# Patient Record
Sex: FEMALE | Race: WHITE | URBAN - METROPOLITAN AREA
[De-identification: names, ages, dates, MRNs, and addresses within clinical notes are randomized per-mention and may not be internally consistent; named-entity substitution may affect disease eponyms.]

---

## 2022-08-01 ENCOUNTER — OFFICE VISIT (OUTPATIENT)
Dept: URBAN - METROPOLITAN AREA TELEHEALTH 2 | Facility: TELEHEALTH | Age: 32
End: 2022-08-01

## 2022-08-01 NOTE — HPI-TODAY'S VISIT:
Referred Dr. Keshia Putnam for evalaution of abdominal pain.  A copy of this note will be sent to the referring provider.    CT A/P w/o contrast on 07/13/2022:  Normal.  RUQ US 07/11/2022:  Normal.

## 2022-10-06 ENCOUNTER — OFFICE VISIT (OUTPATIENT)
Dept: URBAN - METROPOLITAN AREA CLINIC 70 | Facility: CLINIC | Age: 32
End: 2022-10-06

## 2022-10-06 RX ORDER — SERTRALINE 50 MG/1
1 TABLET TABLET, FILM COATED ORAL ONCE A DAY
Status: ACTIVE | COMMUNITY

## 2022-10-06 RX ORDER — BUPROPION HYDROCHLORIDE 100 MG/1
1 TABLET TABLET, FILM COATED ORAL TWICE A DAY
Status: ACTIVE | COMMUNITY

## 2022-10-06 RX ORDER — CLONAZEPAM 0.25 MG/1
1 TABLET ON THE TONGUE AND ALLOW TO DISSOLVE 30 MINUTES BEFORE BEDTIME TABLET, ORALLY DISINTEGRATING ORAL ONCE A DAY
Status: ACTIVE | COMMUNITY

## 2022-10-06 RX ORDER — DICLOFENAC SODIUM 75 MG/1
1 TABLET AS NEEDED TABLET, DELAYED RELEASE ORAL TWICE A DAY
Status: ACTIVE | COMMUNITY

## 2023-03-02 ENCOUNTER — WEB ENCOUNTER (OUTPATIENT)
Dept: URBAN - METROPOLITAN AREA CLINIC 70 | Facility: CLINIC | Age: 33
End: 2023-03-02

## 2023-03-02 ENCOUNTER — LAB OUTSIDE AN ENCOUNTER (OUTPATIENT)
Dept: URBAN - METROPOLITAN AREA CLINIC 70 | Facility: CLINIC | Age: 33
End: 2023-03-02

## 2023-03-02 ENCOUNTER — OFFICE VISIT (OUTPATIENT)
Dept: URBAN - METROPOLITAN AREA CLINIC 70 | Facility: CLINIC | Age: 33
End: 2023-03-02
Payer: COMMERCIAL

## 2023-03-02 VITALS
WEIGHT: 106.2 LBS | TEMPERATURE: 98.5 F | BODY MASS INDEX: 17.07 KG/M2 | SYSTOLIC BLOOD PRESSURE: 90 MMHG | DIASTOLIC BLOOD PRESSURE: 55 MMHG | HEIGHT: 66 IN | HEART RATE: 77 BPM

## 2023-03-02 DIAGNOSIS — R10.30 LOWER ABDOMINAL PAIN: ICD-10-CM

## 2023-03-02 DIAGNOSIS — R10.13 EPIGASTRIC ABDOMINAL PAIN: ICD-10-CM

## 2023-03-02 DIAGNOSIS — N80.9 ENDOMETRIOSIS: ICD-10-CM

## 2023-03-02 DIAGNOSIS — K62.5 RECTAL BLEEDING: ICD-10-CM

## 2023-03-02 DIAGNOSIS — R93.89 ABNORMAL FINDING ON IMAGING: ICD-10-CM

## 2023-03-02 DIAGNOSIS — R19.7 DIARRHEA, UNSPECIFIED TYPE: ICD-10-CM

## 2023-03-02 PROCEDURE — 99204 OFFICE O/P NEW MOD 45 MIN: CPT

## 2023-03-02 RX ORDER — TRAMADOL HYDROCHLORIDE 50 MG/1
1 TABLET AS NEEDED TABLET, FILM COATED ORAL ONCE A DAY
Status: ACTIVE | COMMUNITY

## 2023-03-02 RX ORDER — ALBUTEROL SULFATE 90 UG/1
1 PUFF AS NEEDED AEROSOL, METERED RESPIRATORY (INHALATION)
Status: ACTIVE | COMMUNITY

## 2023-03-02 RX ORDER — ONDANSETRON 4 MG/1
1 TABLET ON THE TONGUE AND ALLOW TO DISSOLVE TABLET, ORALLY DISINTEGRATING ORAL ONCE A DAY
Status: ACTIVE | COMMUNITY

## 2023-03-02 RX ORDER — CLONAZEPAM 0.25 MG/1
1 TABLET ON THE TONGUE AND ALLOW TO DISSOLVE 30 MINUTES BEFORE BEDTIME TABLET, ORALLY DISINTEGRATING ORAL ONCE A DAY
Status: ACTIVE | COMMUNITY

## 2023-03-02 RX ORDER — BUPROPION HYDROCHLORIDE 100 MG/1
1 TABLET TABLET, FILM COATED ORAL TWICE A DAY
Status: ACTIVE | COMMUNITY

## 2023-03-02 NOTE — HPI-TODAY'S VISIT:
The pt presents for diarrhea, lower abdominal pain, abnormal imaging, weight loss, and epigastric abdominal pain.  She states symptoms have been present over the last year.  She states since the onset of symptoms, she has experienced a 10-15lb weight loss. She has a hx of stage 4 endometriosis and underwent a hysterectomy in 2/2022.  Underwent lysis of adhesions in 11/2021 due to adhesions to the cecum and anterior abdominal.  CT 7/13/2022 was normal.  CT 2/6/2023 showed ascending colitis.  Labs 2/12/2023 showed a normal CBC and CMP.  She admits to 2-3 episodes of loose stools a day.  She notes occasional BRB with bowel movements.  She has never had a colonoscopy.  She denies a fhx of colon cancer or IBD.   She admits to epigastric abdominal pain, nausea, and vomiting.  US 7/11/2022 was normal.  She has never had an EGD.  She is not currently taking a PPI.  She denies melena, regurgitation, or dysphagia.

## 2023-03-02 NOTE — PHYSICAL EXAM GASTROINTESTINAL
Abdomen , soft, moderate epigastric TTP, nondistended , no guarding or rigidity , no masses palpable , normal bowel sounds , Liver and Spleen , no hepatosplenomegaly , liver nontender

## 2023-03-06 PROBLEM — 408574004: Status: ACTIVE | Noted: 2023-03-02

## 2023-03-06 PROBLEM — 129103003: Status: ACTIVE | Noted: 2023-03-02

## 2023-03-22 ENCOUNTER — CLAIMS CREATED FROM THE CLAIM WINDOW (OUTPATIENT)
Dept: URBAN - METROPOLITAN AREA SURGERY CENTER 24 | Facility: SURGERY CENTER | Age: 33
End: 2023-03-22
Payer: COMMERCIAL

## 2023-03-22 ENCOUNTER — OFFICE VISIT (OUTPATIENT)
Dept: URBAN - METROPOLITAN AREA SURGERY CENTER 24 | Facility: SURGERY CENTER | Age: 33
End: 2023-03-22

## 2023-03-22 ENCOUNTER — CLAIMS CREATED FROM THE CLAIM WINDOW (OUTPATIENT)
Dept: URBAN - METROPOLITAN AREA CLINIC 4 | Facility: CLINIC | Age: 33
End: 2023-03-22
Payer: COMMERCIAL

## 2023-03-22 DIAGNOSIS — R19.7 ACUTE DIARRHEA: ICD-10-CM

## 2023-03-22 DIAGNOSIS — R93.3 ABN FINDINGS-GI TRACT: ICD-10-CM

## 2023-03-22 DIAGNOSIS — K62.5 ANAL BLEEDING: ICD-10-CM

## 2023-03-22 DIAGNOSIS — K63.89 OTHER SPECIFIED DISEASES OF INTESTINE: ICD-10-CM

## 2023-03-22 PROCEDURE — 88305 TISSUE EXAM BY PATHOLOGIST: CPT | Performed by: PATHOLOGY

## 2023-03-22 PROCEDURE — 45380 COLONOSCOPY AND BIOPSY: CPT | Performed by: INTERNAL MEDICINE

## 2023-03-22 PROCEDURE — G8907 PT DOC NO EVENTS ON DISCHARG: HCPCS | Performed by: INTERNAL MEDICINE

## 2023-03-22 RX ORDER — TRAMADOL HYDROCHLORIDE 50 MG/1
1 TABLET AS NEEDED TABLET, FILM COATED ORAL ONCE A DAY
Status: ACTIVE | COMMUNITY

## 2023-03-22 RX ORDER — CLONAZEPAM 0.25 MG/1
1 TABLET ON THE TONGUE AND ALLOW TO DISSOLVE 30 MINUTES BEFORE BEDTIME TABLET, ORALLY DISINTEGRATING ORAL ONCE A DAY
Status: ACTIVE | COMMUNITY

## 2023-03-22 RX ORDER — ONDANSETRON 4 MG/1
1 TABLET ON THE TONGUE AND ALLOW TO DISSOLVE TABLET, ORALLY DISINTEGRATING ORAL ONCE A DAY
Status: ACTIVE | COMMUNITY

## 2023-03-22 RX ORDER — BUPROPION HYDROCHLORIDE 100 MG/1
1 TABLET TABLET, FILM COATED ORAL TWICE A DAY
Status: ACTIVE | COMMUNITY

## 2023-03-22 RX ORDER — ALBUTEROL SULFATE 90 UG/1
1 PUFF AS NEEDED AEROSOL, METERED RESPIRATORY (INHALATION)
Status: ACTIVE | COMMUNITY

## 2023-04-04 ENCOUNTER — LAB OUTSIDE AN ENCOUNTER (OUTPATIENT)
Dept: URBAN - METROPOLITAN AREA CLINIC 35 | Facility: CLINIC | Age: 33
End: 2023-04-04

## 2023-04-04 ENCOUNTER — CLAIMS CREATED FROM THE CLAIM WINDOW (OUTPATIENT)
Dept: URBAN - METROPOLITAN AREA CLINIC 4 | Facility: CLINIC | Age: 33
End: 2023-04-04
Payer: COMMERCIAL

## 2023-04-04 ENCOUNTER — TELEPHONE ENCOUNTER (OUTPATIENT)
Dept: URBAN - METROPOLITAN AREA CLINIC 35 | Facility: CLINIC | Age: 33
End: 2023-04-04

## 2023-04-04 ENCOUNTER — CLAIMS CREATED FROM THE CLAIM WINDOW (OUTPATIENT)
Dept: URBAN - METROPOLITAN AREA SURGERY CENTER 24 | Facility: SURGERY CENTER | Age: 33
End: 2023-04-04
Payer: COMMERCIAL

## 2023-04-04 ENCOUNTER — OFFICE VISIT (OUTPATIENT)
Dept: URBAN - METROPOLITAN AREA SURGERY CENTER 24 | Facility: SURGERY CENTER | Age: 33
End: 2023-04-04

## 2023-04-04 DIAGNOSIS — R11.2 ACUTE NAUSEA WITH NONBILIOUS VOMITING: ICD-10-CM

## 2023-04-04 DIAGNOSIS — K21.9 ACID REFLUX: ICD-10-CM

## 2023-04-04 DIAGNOSIS — K31.89 ACQUIRED DEFORMITY OF DUODENUM: ICD-10-CM

## 2023-04-04 DIAGNOSIS — K29.70 GASTRITIS, UNSPECIFIED, WITHOUT BLEEDING: ICD-10-CM

## 2023-04-04 LAB
C-REACTIVE PROTEIN, QUANT: <0.2
IMMUNOGLOBULIN A, QN, SERUM: 109
INTERPRETATION: (no result)
SED RATE BY MODIFIED: 2
T-TRANSGLUTAMINASE (TTG) IGA: <1

## 2023-04-04 PROCEDURE — 43239 EGD BIOPSY SINGLE/MULTIPLE: CPT | Performed by: INTERNAL MEDICINE

## 2023-04-04 PROCEDURE — 88312 SPECIAL STAINS GROUP 1: CPT | Performed by: PATHOLOGY

## 2023-04-04 PROCEDURE — G8907 PT DOC NO EVENTS ON DISCHARG: HCPCS | Performed by: INTERNAL MEDICINE

## 2023-04-04 PROCEDURE — 88305 TISSUE EXAM BY PATHOLOGIST: CPT | Performed by: PATHOLOGY

## 2023-04-04 RX ORDER — ONDANSETRON 4 MG/1
1 TABLET ON THE TONGUE AND ALLOW TO DISSOLVE TABLET, ORALLY DISINTEGRATING ORAL ONCE A DAY
Status: ACTIVE | COMMUNITY

## 2023-04-04 RX ORDER — CLONAZEPAM 0.25 MG/1
1 TABLET ON THE TONGUE AND ALLOW TO DISSOLVE 30 MINUTES BEFORE BEDTIME TABLET, ORALLY DISINTEGRATING ORAL ONCE A DAY
Status: ACTIVE | COMMUNITY

## 2023-04-04 RX ORDER — TRAMADOL HYDROCHLORIDE 50 MG/1
1 TABLET AS NEEDED TABLET, FILM COATED ORAL ONCE A DAY
Status: ACTIVE | COMMUNITY

## 2023-04-04 RX ORDER — BUPROPION HYDROCHLORIDE 100 MG/1
1 TABLET TABLET, FILM COATED ORAL TWICE A DAY
Status: ACTIVE | COMMUNITY

## 2023-04-04 RX ORDER — ALBUTEROL SULFATE 90 UG/1
1 PUFF AS NEEDED AEROSOL, METERED RESPIRATORY (INHALATION)
Status: ACTIVE | COMMUNITY

## 2023-04-05 ENCOUNTER — TELEPHONE ENCOUNTER (OUTPATIENT)
Dept: URBAN - METROPOLITAN AREA CLINIC 35 | Facility: CLINIC | Age: 33
End: 2023-04-05

## 2023-04-05 RX ORDER — ONDANSETRON 8 MG/1
1 TABLET ON THE TONGUE AND ALLOW TO DISSOLVE AS NEEDED TABLET, ORALLY DISINTEGRATING ORAL ONCE A DAY
Qty: 10

## 2023-04-20 ENCOUNTER — TELEPHONE ENCOUNTER (OUTPATIENT)
Dept: URBAN - METROPOLITAN AREA CLINIC 70 | Facility: CLINIC | Age: 33
End: 2023-04-20

## 2023-04-27 ENCOUNTER — DASHBOARD ENCOUNTERS (OUTPATIENT)
Age: 33
End: 2023-04-27

## 2023-04-27 ENCOUNTER — WEB ENCOUNTER (OUTPATIENT)
Dept: URBAN - METROPOLITAN AREA CLINIC 70 | Facility: CLINIC | Age: 33
End: 2023-04-27

## 2023-04-27 ENCOUNTER — OFFICE VISIT (OUTPATIENT)
Dept: URBAN - METROPOLITAN AREA CLINIC 70 | Facility: CLINIC | Age: 33
End: 2023-04-27
Payer: COMMERCIAL

## 2023-04-27 VITALS
HEIGHT: 66 IN | WEIGHT: 102 LBS | SYSTOLIC BLOOD PRESSURE: 104 MMHG | BODY MASS INDEX: 16.39 KG/M2 | HEART RATE: 74 BPM | DIASTOLIC BLOOD PRESSURE: 70 MMHG

## 2023-04-27 DIAGNOSIS — R11.2 NAUSEA AND VOMITING, UNSPECIFIED VOMITING TYPE: ICD-10-CM

## 2023-04-27 DIAGNOSIS — R19.7 DIARRHEA, UNSPECIFIED TYPE: ICD-10-CM

## 2023-04-27 DIAGNOSIS — R10.9 ABDOMINAL PAIN, UNSPECIFIED ABDOMINAL LOCATION: ICD-10-CM

## 2023-04-27 PROBLEM — 21522001: Status: ACTIVE | Noted: 2023-04-27

## 2023-04-27 PROBLEM — 62315008: Status: ACTIVE | Noted: 2023-04-27

## 2023-04-27 PROBLEM — 16932000: Status: ACTIVE | Noted: 2023-04-27

## 2023-04-27 PROCEDURE — 99214 OFFICE O/P EST MOD 30 MIN: CPT | Performed by: INTERNAL MEDICINE

## 2023-04-27 RX ORDER — TRAMADOL HYDROCHLORIDE 50 MG/1
1 TABLET AS NEEDED TABLET, FILM COATED ORAL ONCE A DAY
Status: ACTIVE | COMMUNITY

## 2023-04-27 RX ORDER — CLONAZEPAM 0.25 MG/1
1 TABLET ON THE TONGUE AND ALLOW TO DISSOLVE 30 MINUTES BEFORE BEDTIME TABLET, ORALLY DISINTEGRATING ORAL ONCE A DAY
Status: ACTIVE | COMMUNITY

## 2023-04-27 RX ORDER — FLUOXETINE HYDROCHLORIDE 10 MG/1
1 CAPSULE CAPSULE ORAL ONCE A DAY
Status: ACTIVE | COMMUNITY

## 2023-04-27 RX ORDER — BUPROPION HYDROCHLORIDE 100 MG/1
1 TABLET TABLET, FILM COATED ORAL TWICE A DAY
Status: ACTIVE | COMMUNITY

## 2023-04-27 RX ORDER — ONDANSETRON 8 MG/1
1 TABLET ON THE TONGUE AND ALLOW TO DISSOLVE AS NEEDED TABLET, ORALLY DISINTEGRATING ORAL ONCE A DAY
OUTPATIENT

## 2023-04-27 RX ORDER — ONDANSETRON 8 MG/1
1 TABLET ON THE TONGUE AND ALLOW TO DISSOLVE AS NEEDED TABLET, ORALLY DISINTEGRATING ORAL ONCE A DAY
Qty: 10 | Status: ACTIVE | COMMUNITY

## 2023-04-27 RX ORDER — ALBUTEROL SULFATE 90 UG/1
1 PUFF AS NEEDED AEROSOL, METERED RESPIRATORY (INHALATION)
Status: ACTIVE | COMMUNITY

## 2023-04-27 NOTE — HPI-TODAY'S VISIT:
33 year pleasant female here for f/up  Previously seen by Anna Vargas PA-C with c/o epigastric pain, lower abdominal pain and diarrhea  Pt has hx of endometriosis, uncontrolled depression, and recently diagnosed with POTS  Previous CT scan showed possible ascending colitis, subsequent colonoscopy noted normal, random colon biopsies were unremarkable.  Recent EGD showed gastritis and esophagitis (stomach biopsy negative for HP, esophagus biopsy normal), noted to have food residue in stomach and duodenal bulb. Subsequent gastric emptying study was noted normal.  Continues to have "diarrhea", has an episode after every meal, 2 episodes at least every day. Reports having small volume BM after meal, non-bloody.  C/o post prandial nausea and vomiting.  Concerned about weight loss. Reports weighing 128 lbs end of last year, currently weighs 102 lbs.   Previous work up:  CT 02/2023: Findings concerning for ascending colitis EGD 2023: gastritis and esophagitis (stomach biopsy negative for HP, esophagus biopsy normal), noted to have food residue in stomach and duodenal bulb.  Colonoscopy 2023: Unremarkable. Unremarkable random colon biopsies.  GES 04/07/2023: Normal at 90 minutes.   Blood work:  Celiac panel: Negative  ESR: 2 CRP: less than 0.2

## 2023-05-17 LAB
CALPROTECTIN, FECAL: 25
CLOSTRIDIUM DIFFICILE TOXINB,QL REAL TIME PCR: NOT DETECTED
CULTURE: (no result)
OVA AND PARASITES, CONC AND PERM SMEAR: (no result)

## 2023-05-19 ENCOUNTER — OFFICE VISIT (OUTPATIENT)
Dept: URBAN - METROPOLITAN AREA CLINIC 70 | Facility: CLINIC | Age: 33
End: 2023-05-19